# Patient Record
Sex: FEMALE | Race: OTHER | NOT HISPANIC OR LATINO | Employment: UNEMPLOYED | URBAN - METROPOLITAN AREA
[De-identification: names, ages, dates, MRNs, and addresses within clinical notes are randomized per-mention and may not be internally consistent; named-entity substitution may affect disease eponyms.]

---

## 2023-09-30 ENCOUNTER — HOSPITAL ENCOUNTER (OUTPATIENT)
Dept: RADIOLOGY | Facility: HOSPITAL | Age: 38
Discharge: HOME | End: 2023-09-30
Payer: COMMERCIAL

## 2023-09-30 DIAGNOSIS — R10.2 PELVIC AND PERINEAL PAIN: ICD-10-CM

## 2023-09-30 PROBLEM — Z20.822 PERSON UNDER INVESTIGATION FOR COVID-19: Status: RESOLVED | Noted: 2023-09-30 | Resolved: 2023-09-30

## 2023-09-30 PROBLEM — L85.3 DRY SKIN DERMATITIS: Status: ACTIVE | Noted: 2023-09-30

## 2023-09-30 PROBLEM — G89.18 POST-OP PAIN: Status: ACTIVE | Noted: 2023-09-30

## 2023-09-30 PROBLEM — O16.9 HYPERTENSION IN PREGNANCY, ANTEPARTUM (HHS-HCC): Status: ACTIVE | Noted: 2023-09-30

## 2023-09-30 PROBLEM — E22.1 HYPERPROLACTINEMIA (MULTI): Status: ACTIVE | Noted: 2023-09-30

## 2023-09-30 PROBLEM — R51.9 HEADACHE: Status: ACTIVE | Noted: 2023-09-30

## 2023-09-30 PROBLEM — R35.0 URINARY FREQUENCY: Status: ACTIVE | Noted: 2023-09-30

## 2023-09-30 PROBLEM — R87.810 CERVICAL HIGH RISK HPV (HUMAN PAPILLOMAVIRUS) TEST POSITIVE: Status: ACTIVE | Noted: 2023-09-30

## 2023-09-30 PROBLEM — Z20.822 EXPOSURE TO COVID-19 VIRUS: Status: RESOLVED | Noted: 2023-09-30 | Resolved: 2023-09-30

## 2023-09-30 PROBLEM — R31.9 HEMATURIA: Status: ACTIVE | Noted: 2023-09-30

## 2023-09-30 PROBLEM — Z86.59 HISTORY OF CLAUSTROPHOBIA: Status: ACTIVE | Noted: 2023-09-30

## 2023-09-30 PROBLEM — I10 HTN (HYPERTENSION): Status: ACTIVE | Noted: 2023-09-30

## 2023-09-30 PROBLEM — A74.9 CHLAMYDIA INFECTION: Status: ACTIVE | Noted: 2023-09-30

## 2023-09-30 PROBLEM — R05.9 COUGH IN ADULT: Status: ACTIVE | Noted: 2023-09-30

## 2023-09-30 PROBLEM — F98.8 ADD (ATTENTION DEFICIT DISORDER): Status: ACTIVE | Noted: 2023-09-30

## 2023-09-30 PROBLEM — M72.2 PLANTAR FASCIITIS, LEFT: Status: ACTIVE | Noted: 2023-09-30

## 2023-09-30 PROBLEM — Z86.79 HISTORY OF HYPERTENSION: Status: ACTIVE | Noted: 2023-09-30

## 2023-09-30 PROBLEM — O20.0 THREATENED SPONTANEOUS ABORTION (HHS-HCC): Status: ACTIVE | Noted: 2023-09-30

## 2023-09-30 PROBLEM — G44.201 ACUTE INTRACTABLE TENSION-TYPE HEADACHE: Status: RESOLVED | Noted: 2023-09-30 | Resolved: 2023-09-30

## 2023-09-30 PROBLEM — R10.12 ABDOMINAL PAIN, LEFT UPPER QUADRANT: Status: ACTIVE | Noted: 2023-09-30

## 2023-09-30 PROBLEM — N92.6 IRREGULAR MENSTRUATION: Status: ACTIVE | Noted: 2023-09-30

## 2023-09-30 PROBLEM — N39.0 URINARY TRACT INFECTION: Status: ACTIVE | Noted: 2023-09-30

## 2023-09-30 PROBLEM — M22.40 CHONDROMALACIA, PATELLA: Status: ACTIVE | Noted: 2023-09-30

## 2023-09-30 PROBLEM — R10.9 ABDOMINAL PAIN: Status: ACTIVE | Noted: 2023-09-30
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
## 2023-09-30 PROBLEM — H00.015 HORDEOLUM EXTERNUM OF LEFT LOWER EYELID: Status: ACTIVE | Noted: 2023-09-30

## 2023-09-30 PROBLEM — J01.90 ACUTE NON-RECURRENT SINUSITIS: Status: RESOLVED | Noted: 2023-09-30 | Resolved: 2023-09-30

## 2023-09-30 PROBLEM — N92.6 MISSED MENSES: Status: ACTIVE | Noted: 2023-09-30

## 2023-09-30 PROBLEM — H00.014 HORDEOLUM EXTERNUM OF LEFT UPPER EYELID: Status: ACTIVE | Noted: 2023-09-30

## 2023-09-30 PROBLEM — Q78.9: Status: ACTIVE | Noted: 2023-09-30

## 2023-09-30 PROBLEM — J01.00 ACUTE MAXILLARY SINUSITIS: Status: RESOLVED | Noted: 2023-09-30 | Resolved: 2023-09-30
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
## 2023-09-30 PROBLEM — S06.0X0A CONCUSSION WITH NO LOSS OF CONSCIOUSNESS: Status: ACTIVE | Noted: 2023-09-30

## 2023-09-30 PROBLEM — N92.6 ABNORMAL MENSES: Status: ACTIVE | Noted: 2023-09-30

## 2023-09-30 PROBLEM — F41.9 ANXIETY: Status: ACTIVE | Noted: 2023-09-30

## 2023-09-30 PROBLEM — R63.4 WEIGHT LOSS: Status: ACTIVE | Noted: 2023-09-30

## 2023-09-30 PROBLEM — E55.9 VITAMIN D DEFICIENCY: Status: ACTIVE | Noted: 2023-09-30

## 2023-09-30 PROBLEM — D49.7 PITUITARY TUMOR: Status: ACTIVE | Noted: 2023-09-30

## 2023-09-30 PROBLEM — G43.009 MIGRAINE WITHOUT AURA AND WITHOUT STATUS MIGRAINOSUS, NOT INTRACTABLE: Status: ACTIVE | Noted: 2023-09-30

## 2023-09-30 PROBLEM — N91.2 AMENORRHEA: Status: ACTIVE | Noted: 2023-09-30

## 2023-09-30 PROBLEM — J45.20 MILD INTERMITTENT ASTHMA WITHOUT COMPLICATION (HHS-HCC): Status: ACTIVE | Noted: 2023-09-30

## 2023-09-30 PROBLEM — U07.1 LAB TEST POSITIVE FOR DETECTION OF COVID-19 VIRUS: Status: RESOLVED | Noted: 2023-09-30 | Resolved: 2023-09-30

## 2023-09-30 PROCEDURE — 74176 CT ABD & PELVIS W/O CONTRAST: CPT

## 2023-09-30 RX ORDER — ACETAMINOPHEN 325 MG/1
650 TABLET ORAL EVERY 4 HOURS PRN
COMMUNITY
Start: 2022-11-28

## 2023-09-30 RX ORDER — LIDOCAINE 50 MG/G
1 PATCH TOPICAL EVERY 24 HOURS
COMMUNITY
Start: 2022-11-15 | End: 2023-10-03

## 2023-09-30 RX ORDER — DICYCLOMINE HYDROCHLORIDE 20 MG/1
20 TABLET ORAL 4 TIMES DAILY
COMMUNITY
Start: 2023-04-28 | End: 2023-10-03

## 2023-09-30 RX ORDER — OXYCODONE HYDROCHLORIDE 5 MG/1
1 TABLET ORAL EVERY 6 HOURS
COMMUNITY
Start: 2021-04-13 | End: 2023-10-03

## 2023-09-30 RX ORDER — ONDANSETRON 4 MG/1
1 TABLET, ORALLY DISINTEGRATING ORAL EVERY 8 HOURS PRN
COMMUNITY
Start: 2023-04-28 | End: 2023-10-03

## 2023-09-30 RX ORDER — ONDANSETRON 4 MG/1
4 TABLET, FILM COATED ORAL EVERY 6 HOURS PRN
COMMUNITY
End: 2023-10-03

## 2023-09-30 RX ORDER — DOCUSATE SODIUM 100 MG/1
1 CAPSULE, LIQUID FILLED ORAL 2 TIMES DAILY
COMMUNITY
Start: 2021-04-13 | End: 2023-10-03

## 2023-09-30 RX ORDER — LOPERAMIDE HYDROCHLORIDE 2 MG/1
2 CAPSULE ORAL 4 TIMES DAILY PRN
COMMUNITY
Start: 2023-04-28 | End: 2023-10-03

## 2023-09-30 RX ORDER — ETONOGESTREL AND ETHINYL ESTRADIOL VAGINAL RING .015; .12 MG/D; MG/D
1 RING VAGINAL
COMMUNITY
Start: 2022-06-28 | End: 2023-10-03

## 2023-09-30 RX ORDER — ALPRAZOLAM 0.25 MG/1
1 TABLET ORAL SEE ADMIN INSTRUCTIONS
COMMUNITY
Start: 2022-05-02 | End: 2023-10-03

## 2023-09-30 RX ORDER — CYCLOBENZAPRINE HCL 10 MG
1 TABLET ORAL 3 TIMES DAILY PRN
COMMUNITY
Start: 2022-11-15 | End: 2023-10-03

## 2023-09-30 RX ORDER — METHOCARBAMOL 500 MG/1
1 TABLET, FILM COATED ORAL EVERY 6 HOURS PRN
COMMUNITY
Start: 2022-11-28 | End: 2023-10-03

## 2023-09-30 RX ORDER — IBUPROFEN 600 MG/1
1 TABLET ORAL EVERY 6 HOURS PRN
COMMUNITY
Start: 2021-04-13 | End: 2023-10-03

## 2023-09-30 RX ORDER — TRAMADOL HYDROCHLORIDE 50 MG/1
1 TABLET ORAL 4 TIMES DAILY PRN
COMMUNITY
End: 2023-10-03

## 2023-09-30 RX ORDER — ALBUTEROL SULFATE 90 UG/1
2 AEROSOL, METERED RESPIRATORY (INHALATION) EVERY 4 HOURS PRN
COMMUNITY
Start: 2021-02-22

## 2023-10-03 ENCOUNTER — CONSULT (OUTPATIENT)
Dept: OBSTETRICS AND GYNECOLOGY | Facility: CLINIC | Age: 38
End: 2023-10-03
Payer: COMMERCIAL

## 2023-10-03 VITALS
DIASTOLIC BLOOD PRESSURE: 72 MMHG | WEIGHT: 142 LBS | BODY MASS INDEX: 23.66 KG/M2 | SYSTOLIC BLOOD PRESSURE: 116 MMHG | HEIGHT: 65 IN

## 2023-10-03 DIAGNOSIS — R10.2 PAIN IN FEMALE PELVIS: Primary | ICD-10-CM

## 2023-10-03 PROCEDURE — 99213 OFFICE O/P EST LOW 20 MIN: CPT | Performed by: OBSTETRICS & GYNECOLOGY

## 2023-10-03 ASSESSMENT — ENCOUNTER SYMPTOMS: BACK PAIN: 1

## 2023-10-03 NOTE — PROGRESS NOTES
Subjective   Patient ID: Jayda Gonzales is a 38 y.o. female who presents for Pelvic Pain and Back Pain (Follow up [facility] ED ultrasound and CT scan 09/21/2023.).  Pelvic Pain  The patient's primary symptoms include pelvic pain. Associated symptoms include back pain.   Back Pain  Associated symptoms include pelvic pain.    2 prior C-sections.  Started having pelvic pain mid-September.  Eventually radiating to her left flank and into the back.  Now also up in the ribs.    Issues she had some nausea but it is resolved.  No GI or  symptoms.  First day of her last menses was around September 10.  States there is no way she could be pregnant.  Not using contraception.    The pain is much less severe.  I reviewed results from her emergency room visit including blood work as well as pelvic ultrasound.  The ultrasound showed bilateral ovarian follicles    She states she got a voicemail from her primary care physician that recent CT was normal.    We talked about possibilities but her pain radiates from the pelvis up to her ribs.  It is less severe than it was.  Imaging has been normal.  At this time we agreed not to proceed with any further investigations.  If her pain increases or severely affects her quality of life we can review options.  Otherwise follow-up for annual exam at which time we can do a complete abdominal and pelvic exam.    Review of Systems   Genitourinary:  Positive for pelvic pain.   Musculoskeletal:  Positive for back pain.   All other systems reviewed and are negative.      Objective   Physical Exam    Assessment/Plan   38-year-old.  2 prior C-sections.  See HPI for pelvic pain.  Follow-up for annual exam.  Results reviewed include ultrasound blood work and she had a verbal from her primary care physician that her CT scan abdomen pelvis was normal

## 2024-01-11 ENCOUNTER — TELEMEDICINE (OUTPATIENT)
Dept: PRIMARY CARE | Facility: CLINIC | Age: 39
End: 2024-01-11
Payer: COMMERCIAL

## 2024-01-11 DIAGNOSIS — Z86.79 HISTORY OF HYPERTENSION: ICD-10-CM

## 2024-01-11 DIAGNOSIS — J01.00 ACUTE NON-RECURRENT MAXILLARY SINUSITIS: Primary | ICD-10-CM

## 2024-01-11 PROCEDURE — 99212 OFFICE O/P EST SF 10 MIN: CPT | Performed by: INTERNAL MEDICINE

## 2024-01-11 RX ORDER — AZITHROMYCIN 250 MG/1
TABLET, FILM COATED ORAL
Qty: 6 TABLET | Refills: 0 | Status: SHIPPED | OUTPATIENT
Start: 2024-01-11 | End: 2024-01-16

## 2024-01-11 NOTE — PROGRESS NOTES
Subjective   Patient ID: Jayda Gonzales is a 38 y.o. female who presents for No chief complaint on file..    HPI patient is being seen via virtual visit secondary to upper respiratory infection.  Basically her symptoms started around Martina day gradually improved and now back with sinus pressure body aches chills nausea no cough drainage down the back of her throat brown in color.  She did a COVID test yesterday that was negative her children are sick    Review of Systems  Skin no rash  Cardiovascular no chest pain  GI nausea no vomiting or diarrhea    Objective     There were no vitals taken for this visit.    Physical Exam  No acute distress    Assessment/Plan   Diagnoses and all orders for this visit:  Acute non-recurrent maxillary sinusitis  -     azithromycin (Zithromax) 250 mg tablet; Take 2 tablets (500 mg) by mouth once daily for 1 day, THEN 1 tablet (250 mg) once daily for 4 days. Take 2 tabs (500 mg) by mouth today, than 1 daily for 4 days..

## 2024-02-20 ENCOUNTER — TELEMEDICINE (OUTPATIENT)
Dept: PRIMARY CARE | Facility: CLINIC | Age: 39
End: 2024-02-20
Payer: COMMERCIAL

## 2024-02-20 DIAGNOSIS — F41.9 ANXIETY: Primary | ICD-10-CM

## 2024-02-20 PROCEDURE — 1036F TOBACCO NON-USER: CPT | Performed by: INTERNAL MEDICINE

## 2024-02-20 PROCEDURE — 99214 OFFICE O/P EST MOD 30 MIN: CPT | Performed by: INTERNAL MEDICINE

## 2024-02-20 RX ORDER — ALPRAZOLAM 0.25 MG/1
0.25 TABLET ORAL DAILY PRN
Qty: 7 TABLET | Refills: 0 | Status: SHIPPED | OUTPATIENT
Start: 2024-02-20 | End: 2024-02-27

## 2024-02-20 NOTE — PROGRESS NOTES
Subjective   Patient ID: Jayda Gonzales is a 38 y.o. female who presents for No chief complaint on file..    HPI 38-year-old female well-known to me being seen via virtual visit who is currently having a lot of anxiety regarding dealing with her children and their father.  Her 4-year-old son has autism and has had 2 visits recently unsupervised with the father and he is come back acting differently she is very concerned.  She will be getting her  involved and also in the Department of family and children services as well as the  are involved.  She is calling me she has a lot of stress coming up in the next couple of days and would like something to help with her anxiety.  She is taking Xanax prior to having MRI in the past she does not want to be on anything long-term such as an antidepressant.    Review of Systems    Objective   There were no vitals taken for this visit.    Physical Exam  Neurological:      Mental Status: She is alert.   Psychiatric:         Mood and Affect: Mood normal.         Behavior: Behavior normal.         Thought Content: Thought content normal.         Judgment: Judgment normal.         Assessment/Plan   Diagnoses and all orders for this visit:  Anxiety  This will be a one-time short-term prescription she has never had any history of drug abuse in the past and she is going through an extremely stressful time.  If she needs something long-term we may consider Celexa.  She will follow-up with me this Thursday  -     ALPRAZolam (Xanax) 0.25 mg tablet; Take 1 tablet (0.25 mg) by mouth once daily as needed for anxiety for up to 7 days.

## 2024-10-30 ENCOUNTER — OFFICE VISIT (OUTPATIENT)
Dept: URGENT CARE | Age: 39
End: 2024-10-30
Payer: COMMERCIAL

## 2024-10-30 VITALS
SYSTOLIC BLOOD PRESSURE: 168 MMHG | OXYGEN SATURATION: 99 % | HEART RATE: 56 BPM | DIASTOLIC BLOOD PRESSURE: 76 MMHG | WEIGHT: 150 LBS | BODY MASS INDEX: 24.96 KG/M2 | RESPIRATION RATE: 15 BRPM | TEMPERATURE: 98.4 F

## 2024-10-30 DIAGNOSIS — J03.90 TONSILLITIS: Primary | ICD-10-CM

## 2024-10-30 DIAGNOSIS — J02.9 SORE THROAT: ICD-10-CM

## 2024-10-30 LAB
POC BINAX EXPIRATION: NORMAL
POC BINAX NOW COVID SERIAL NUMBER: NORMAL
POC RAPID STREP: NEGATIVE
POC SARS-COV-2 AG BINAX: NORMAL

## 2024-10-30 PROCEDURE — 3077F SYST BP >= 140 MM HG: CPT

## 2024-10-30 PROCEDURE — 87081 CULTURE SCREEN ONLY: CPT

## 2024-10-30 PROCEDURE — 87880 STREP A ASSAY W/OPTIC: CPT

## 2024-10-30 PROCEDURE — 1036F TOBACCO NON-USER: CPT

## 2024-10-30 PROCEDURE — 3078F DIAST BP <80 MM HG: CPT

## 2024-10-30 PROCEDURE — 99213 OFFICE O/P EST LOW 20 MIN: CPT

## 2024-10-30 PROCEDURE — 87811 SARS-COV-2 COVID19 W/OPTIC: CPT

## 2024-10-30 RX ORDER — AMOXICILLIN 500 MG/1
500 CAPSULE ORAL 2 TIMES DAILY
Qty: 20 CAPSULE | Refills: 0 | Status: SHIPPED | OUTPATIENT
Start: 2024-10-30 | End: 2024-11-09

## 2024-10-30 RX ORDER — AZITHROMYCIN 500 MG/1
500 TABLET, FILM COATED ORAL DAILY
Qty: 5 TABLET | Refills: 0 | Status: SHIPPED | OUTPATIENT
Start: 2024-10-30 | End: 2024-11-04

## 2024-10-30 ASSESSMENT — ENCOUNTER SYMPTOMS
FATIGUE: 1
DIARRHEA: 1
SORE THROAT: 1

## 2024-11-01 LAB — S PYO THROAT QL CULT: NORMAL

## 2024-11-04 ENCOUNTER — LAB (OUTPATIENT)
Dept: LAB | Facility: LAB | Age: 39
End: 2024-11-04
Payer: COMMERCIAL

## 2024-11-04 ENCOUNTER — TELEPHONE (OUTPATIENT)
Dept: PRIMARY CARE | Facility: CLINIC | Age: 39
End: 2024-11-04

## 2024-11-04 ENCOUNTER — APPOINTMENT (OUTPATIENT)
Dept: PRIMARY CARE | Facility: CLINIC | Age: 39
End: 2024-11-04
Payer: COMMERCIAL

## 2024-11-04 VITALS
BODY MASS INDEX: 24.79 KG/M2 | OXYGEN SATURATION: 98 % | SYSTOLIC BLOOD PRESSURE: 140 MMHG | HEART RATE: 97 BPM | RESPIRATION RATE: 18 BRPM | WEIGHT: 149 LBS | DIASTOLIC BLOOD PRESSURE: 92 MMHG

## 2024-11-04 DIAGNOSIS — Z00.00 HEALTH CARE MAINTENANCE: ICD-10-CM

## 2024-11-04 DIAGNOSIS — I10 PRIMARY HYPERTENSION: ICD-10-CM

## 2024-11-04 DIAGNOSIS — Z00.00 HEALTH MAINTENANCE EXAMINATION: Primary | ICD-10-CM

## 2024-11-04 DIAGNOSIS — Z00.00 HEALTH MAINTENANCE EXAMINATION: ICD-10-CM

## 2024-11-04 LAB
ALBUMIN SERPL BCP-MCNC: 4.2 G/DL (ref 3.4–5)
ALP SERPL-CCNC: 52 U/L (ref 33–110)
ALT SERPL W P-5'-P-CCNC: 17 U/L (ref 7–45)
ANION GAP SERPL CALC-SCNC: 11 MMOL/L (ref 10–20)
AST SERPL W P-5'-P-CCNC: 17 U/L (ref 9–39)
BASOPHILS # BLD AUTO: 0.04 X10*3/UL (ref 0–0.1)
BASOPHILS NFR BLD AUTO: 0.4 %
BILIRUB SERPL-MCNC: 0.5 MG/DL (ref 0–1.2)
BUN SERPL-MCNC: 15 MG/DL (ref 6–23)
CALCIUM SERPL-MCNC: 9.2 MG/DL (ref 8.6–10.6)
CHLORIDE SERPL-SCNC: 102 MMOL/L (ref 98–107)
CHOLEST SERPL-MCNC: 170 MG/DL (ref 0–199)
CHOLESTEROL/HDL RATIO: 2.2
CO2 SERPL-SCNC: 27 MMOL/L (ref 21–32)
CREAT SERPL-MCNC: 0.65 MG/DL (ref 0.5–1.05)
EGFRCR SERPLBLD CKD-EPI 2021: >90 ML/MIN/1.73M*2
EOSINOPHIL # BLD AUTO: 0.16 X10*3/UL (ref 0–0.7)
EOSINOPHIL NFR BLD AUTO: 1.6 %
ERYTHROCYTE [DISTWIDTH] IN BLOOD BY AUTOMATED COUNT: 13 % (ref 11.5–14.5)
GLUCOSE SERPL-MCNC: 79 MG/DL (ref 74–99)
HCT VFR BLD AUTO: 41.9 % (ref 36–46)
HDLC SERPL-MCNC: 77.4 MG/DL
HGB BLD-MCNC: 13.5 G/DL (ref 12–16)
IMM GRANULOCYTES # BLD AUTO: 0.04 X10*3/UL (ref 0–0.7)
IMM GRANULOCYTES NFR BLD AUTO: 0.4 % (ref 0–0.9)
LDLC SERPL CALC-MCNC: 81 MG/DL
LYMPHOCYTES # BLD AUTO: 1.94 X10*3/UL (ref 1.2–4.8)
LYMPHOCYTES NFR BLD AUTO: 19.7 %
MCH RBC QN AUTO: 28.1 PG (ref 26–34)
MCHC RBC AUTO-ENTMCNC: 32.2 G/DL (ref 32–36)
MCV RBC AUTO: 87 FL (ref 80–100)
MONOCYTES # BLD AUTO: 0.82 X10*3/UL (ref 0.1–1)
MONOCYTES NFR BLD AUTO: 8.3 %
NEUTROPHILS # BLD AUTO: 6.85 X10*3/UL (ref 1.2–7.7)
NEUTROPHILS NFR BLD AUTO: 69.6 %
NON HDL CHOLESTEROL: 93 MG/DL (ref 0–149)
NRBC BLD-RTO: 0 /100 WBCS (ref 0–0)
PLATELET # BLD AUTO: 291 X10*3/UL (ref 150–450)
POTASSIUM SERPL-SCNC: 4.1 MMOL/L (ref 3.5–5.3)
PROT SERPL-MCNC: 7 G/DL (ref 6.4–8.2)
RBC # BLD AUTO: 4.8 X10*6/UL (ref 4–5.2)
SODIUM SERPL-SCNC: 136 MMOL/L (ref 136–145)
TRIGL SERPL-MCNC: 59 MG/DL (ref 0–149)
TSH SERPL-ACNC: 1.02 MIU/L (ref 0.44–3.98)
VLDL: 12 MG/DL (ref 0–40)
WBC # BLD AUTO: 9.9 X10*3/UL (ref 4.4–11.3)

## 2024-11-04 PROCEDURE — 80061 LIPID PANEL: CPT

## 2024-11-04 PROCEDURE — 80053 COMPREHEN METABOLIC PANEL: CPT

## 2024-11-04 PROCEDURE — 36415 COLL VENOUS BLD VENIPUNCTURE: CPT

## 2024-11-04 PROCEDURE — 85025 COMPLETE CBC W/AUTO DIFF WBC: CPT

## 2024-11-04 PROCEDURE — 84443 ASSAY THYROID STIM HORMONE: CPT

## 2024-11-04 PROCEDURE — 3077F SYST BP >= 140 MM HG: CPT | Performed by: INTERNAL MEDICINE

## 2024-11-04 PROCEDURE — 99214 OFFICE O/P EST MOD 30 MIN: CPT | Performed by: INTERNAL MEDICINE

## 2024-11-04 PROCEDURE — 3080F DIAST BP >= 90 MM HG: CPT | Performed by: INTERNAL MEDICINE

## 2024-11-04 PROCEDURE — 1036F TOBACCO NON-USER: CPT | Performed by: INTERNAL MEDICINE

## 2024-11-04 ASSESSMENT — ENCOUNTER SYMPTOMS
SHORTNESS OF BREATH: 0
PALPITATIONS: 0
UNEXPECTED WEIGHT CHANGE: 0
HEADACHES: 1
DEPRESSION: 1
SORE THROAT: 0
LOSS OF SENSATION IN FEET: 0
DYSPHORIC MOOD: 0
ABDOMINAL PAIN: 0
ACTIVITY CHANGE: 0
OCCASIONAL FEELINGS OF UNSTEADINESS: 0

## 2024-11-04 ASSESSMENT — PATIENT HEALTH QUESTIONNAIRE - PHQ9
1. LITTLE INTEREST OR PLEASURE IN DOING THINGS: NOT AT ALL
10. IF YOU CHECKED OFF ANY PROBLEMS, HOW DIFFICULT HAVE THESE PROBLEMS MADE IT FOR YOU TO DO YOUR WORK, TAKE CARE OF THINGS AT HOME, OR GET ALONG WITH OTHER PEOPLE: VERY DIFFICULT
SUM OF ALL RESPONSES TO PHQ9 QUESTIONS 1 AND 2: 1
2. FEELING DOWN, DEPRESSED OR HOPELESS: SEVERAL DAYS

## 2024-11-04 NOTE — PROGRESS NOTES
Subjective   Patient ID: Jayda Gonzaels is a 39 y.o. female who presents for No chief complaint on file..    HPI patient is a 39-year-old female comes for checkup blood pressures been running high when she was in ER in urgent care.    Exercise yes mainly calisthenics some [place] dance class    Diet not salt restricted    Review of Systems   Constitutional:  Negative for activity change and unexpected weight change.   HENT:  Positive for ear pain (Recent upper respiratory infection sore throat treated with azithromycin still has a little bit of fullness in the left ear). Negative for sore throat.    Respiratory:  Negative for shortness of breath.    Cardiovascular:  Negative for chest pain, palpitations and leg swelling.   Gastrointestinal:  Negative for abdominal pain.   Genitourinary:  Negative for menstrual problem (Patient's periods are generally normal).   Neurological:  Positive for headaches (Headache only recently started coinciding with her upper respiratory infection).   Psychiatric/Behavioral:  Negative for dysphoric mood.        Objective   BP (!) 140/92 (BP Location: Left arm, Patient Position: Sitting)   Pulse 97   Resp 18   Wt 67.6 kg (149 lb)   SpO2 98%   BMI 24.79 kg/m²     Physical Exam  Blood pressure 110/84 on the left arm  Ears left ear air-fluid level mild erythema  Lungs clear to auscultation  Heart regular rate and rhythm without gallop rub or murmur  Abdomen negative  Extremities no edema  Assessment/Plan   Diagnoses and all orders for this visit:  Health maintenance examination  -     CBC and Auto Differential; Future  Health care maintenance  -     Comprehensive Metabolic Panel; Future  Primary hypertension  Your blood pressure really looks a lot better today.  Try cutting back on salt and increasing your exercise.  I will check some routine blood work to make sure you are kidney function and thyroid blood count are okay.  He would like to see a nutritionist will be helpful  -      Lipid Panel; Future  -     TSH; Future  -     Referral to Nutrition Services; Future  Left ear clogged you have residual infection finish your antibiotics they will stay in your system for a few more days if you are still having problems let me know  Headache I think this is more related to your recent infection.  You previously had a pituitary tumor that was associated with abnormal menstruation.  Your menstruation is now normal

## 2024-11-05 ENCOUNTER — APPOINTMENT (OUTPATIENT)
Dept: PRIMARY CARE | Facility: CLINIC | Age: 39
End: 2024-11-05
Payer: COMMERCIAL

## 2024-11-05 ENCOUNTER — NUTRITION (OUTPATIENT)
Dept: PRIMARY CARE | Facility: CLINIC | Age: 39
End: 2024-11-05
Payer: COMMERCIAL

## 2024-11-05 VITALS — BODY MASS INDEX: 24.79 KG/M2 | HEIGHT: 65 IN

## 2024-11-05 DIAGNOSIS — I10 PRIMARY HYPERTENSION: ICD-10-CM

## 2024-11-05 DIAGNOSIS — Z71.3 DIETARY COUNSELING AND SURVEILLANCE: Primary | ICD-10-CM

## 2024-11-05 NOTE — PROGRESS NOTES
Nutrition: Initial Assessment    Jayda Goznales is a 39 y.o. female being seen in office who was referred by Dr. Naif Raines on 24 for HTN.       Nutrition Assessment    Food & Nutrition Related History:     Dietary Considerations: {dietaryconsiderations (Optional):33549}  {Allergies:24078}  {Intolerance:48316}  {Appetite:15648}  {Intake:93703}  {GI Symptoms (Optional):39164}  {Swallowing Difficulty:83657}  {Dentition (Optional):45989}  {Food Preparation:47566}  {Cooking Skills/Barriers:85555}  {Grocery Shoppin}  {Supplements:62497} {FREQUENCY:18396}  {Sleep duration/quality (Optional):74759}  {Sleep disorders:75230}  Food Insecurity:     Dietary Recall:   Meal 1:   Meal 2:   Meal 3:     Snacks:  Beverages:  Eating out:   Alcohol Intake:  Self-Identified Challenges:    Physical Activity    Labs:  CMP trend:    Recent Labs     24  1317 23  2357 22  0948 19  2330 19  2217   GLUCOSE 79 97 89   < > 72    138 135*   < > 135   K 4.1 3.5 3.6   < > 3.9    103 101   < > 102   CO2 27 26 26   < > 19*   ANIONGAP 11 13 12   < > 14   BUN 15 10 9   < > 7*   CREATININE 0.65 0.73 0.58   < > 0.5   EGFR >90  --   --   --  151   CALCIUM 9.2 9.1 8.9   < > 9.0   ALBUMIN 4.2 4.4 4.1   < > 3.9   ALKPHOS 52 38 38   < > 45   PROT 7.0 7.1 6.7   < > 6.8   AST 17 20 14   < > 20   BILITOT 0.5 0.7 0.4   < > 0.3   ALT 17 14 11   < > 14    < > = values in this interval not displayed.     Lipid Panel trend:    Recent Labs     24  1317   CHOL 170   HDL 77.4   LDLCALC 81   VLDL 12   TRIG 59     Vit D:   Lab Results   Component Value Date    VITD2 (A) 2018       Nutrition Focused Physical Exam:    {Performed/Deferred:50246}    Muscle Wasting:                   Loss of Subcutaneous Fat:                Other Physical Findings:                   Past Medical History:  Patient Active Problem List   Diagnosis    Abdominal pain    Abnormal menses    Accidental fall    ADD  "(attention deficit disorder)    Anxiety    Cervical high risk HPV (human papillomavirus) test positive    Chlamydia infection    Chondrodystrophy malacia (HHS-HCC)    Chondromalacia, patella    Concussion with no loss of consciousness    Cough in adult    Dry skin dermatitis    Headache    Hematuria    History of claustrophobia    History of hypertension    Hordeolum externum of left lower eyelid    Hordeolum externum of left upper eyelid    HTN (hypertension)    Hypertension in pregnancy, antepartum (HHS-HCC)    Hyperprolactinemia (Multi)    Migraine without aura and without status migrainosus, not intractable    Mild intermittent asthma without complication (HHS-HCC)    Missed menses    Irregular menstruation    Pituitary tumor    Plantar fasciitis, left    Post-op pain    Threatened spontaneous  (HHS-HCC)    Urinary frequency    Urinary tract infection    Vitamin D deficiency    Weight loss    Amenorrhea    Abdominal pain, left upper quadrant        Anthropometrics:  Ht Readings from Last 1 Encounters:   24 1.651 m (5' 5\")     BMI Readings from Last 1 Encounters:   24 24.79 kg/m²     Wt Readings from Last 10 Encounters:   24 67.6 kg (149 lb)   10/30/24 68 kg (150 lb)   24 66 kg (145 lb 8.1 oz)   24 67.6 kg (149 lb 0.5 oz)   10/03/23 64.4 kg (142 lb)   22 77.6 kg (171 lb)   22 77.6 kg (171 lb)   22 76.2 kg (168 lb)   22 52.6 kg (116 lb)   22 75.3 kg (166 lb)       Weight change:   Significant weight change: {YES/NA/NO:97639}    Estimated Nutrition Needs:                     Nutrition Diagnosis                                                             Nutrition Interventions/Recommendations   FOOD & NUTRIENT DELIVERY: {NUTRIENTDELIVERY:94156}    NUTRITION COUNSELING: {nutrition counselin}    COORDINATION OF CARE: {coordination of care:02778}    NUTRITION EDUCATION:     {Educational Handouts:09516:x::1}    *Patient expressed understanding " of the education provided and denied any additional questions/concerns.       Nutrition Monitoring and Evaluation   {Nutrition Goals (Optional):62597}    {Readiness to Change (Optional):98928}  {Level of Understanding (Optional):64812}  {Anticipated Compliant (Optional):74431}     Follow-up: {RTC:28525}

## 2024-11-05 NOTE — PROGRESS NOTES
Nutrition: Initial Assessment    Jayda Gonzales is a 39 y.o. female being seen in office who was referred by Dr. Naif Raines on 11/4/24 for HTN.       Nutrition Assessment    Food & Nutrition Related History: Pt presents for nutrition counseling. She is concerned about her blood pressure readings and prefers to manage her HTN without medications, if possible. In the past, she followed a more whole food, plant-based diet. However, since moving back to [place], she has increased eating out. The last couple months she has tried to reduce fast food and greasy foods. Does report using salt when cooking. She also notes family stressors that may be influencing her blood pressure. She has 2 children (ages 3 and 5).     Dietary Considerations: does not drink cow's milk   Allergies: lobster, shrimp  Intolerance: None  Appetite: Normal  Intake: >75%  GI Symptoms : None  Swallowing Difficulty: No problems with swallowing  Dentition : own  Food Preparation: Patient  Cooking Skills/Barriers: None reported  Grocery Shopping: Patient  Supplements: Multivitamin and Vitamin C irregularly  Food Insecurity: Denies     Dietary Recall:   - handful of grapes, blueberry bagel with 1/2 tbsp olive oil butter, a couple pieces of cheese, a couple dates, richi shot, handful of mixed nuts, water   - Lays potato chips (1.5 servings)   - 1.5 cups buttered noodles     Snacks: potato chips   Beverages: water   Eating out: 2x per week takeout, 1x dining out   Alcohol Intake: none (gave up etoh 2 yrs ago)     Physical Activity:  2-3x per week, dance and belkis     Labs:  CMP trend:    Recent Labs     11/04/24  1317 09/21/23  2357 06/14/22  0948 05/13/19  2330 03/06/19  2217   GLUCOSE 79 97 89   < > 72    138 135*   < > 135   K 4.1 3.5 3.6   < > 3.9    103 101   < > 102   CO2 27 26 26   < > 19*   ANIONGAP 11 13 12   < > 14   BUN 15 10 9   < > 7*   CREATININE 0.65 0.73 0.58   < > 0.5   EGFR >90  --   --   --  151   CALCIUM 9.2 9.1  8.9   < > 9.0   ALBUMIN 4.2 4.4 4.1   < > 3.9   ALKPHOS 52 38 38   < > 45   PROT 7.0 7.1 6.7   < > 6.8   AST 17 20 14   < > 20   BILITOT 0.5 0.7 0.4   < > 0.3   ALT 17 14 11   < > 14    < > = values in this interval not displayed.     Lipid Panel trend:    Recent Labs     24  1317   CHOL 170   HDL 77.4   LDLCALC 81   VLDL 12   TRIG 59     Vit D:   Lab Results   Component Value Date     (A) 2018     Blood Pressure:   BP Readings from Last 10 Encounters:   24 (!) 140/92   10/30/24 168/76   24 156/89   24 (!) 158/94   24 (!) 164/117   10/03/23 116/72   22 122/72   22 130/76   22 118/68   22 130/72     Nutrition Focused Physical Exam:  Performed/Deferred: Deferred as pt visually appears well-nourished with no signs of malnutrition      Past Medical History:  Patient Active Problem List   Diagnosis    Abdominal pain    Abnormal menses    Accidental fall    ADD (attention deficit disorder)    Anxiety    Cervical high risk HPV (human papillomavirus) test positive    Chlamydia infection    Chondrodystrophy malacia (HHS-HCC)    Chondromalacia, patella    Concussion with no loss of consciousness    Cough in adult    Dry skin dermatitis    Headache    Hematuria    History of claustrophobia    History of hypertension    Hordeolum externum of left lower eyelid    Hordeolum externum of left upper eyelid    HTN (hypertension)    Hypertension in pregnancy, antepartum (HHS-HCC)    Hyperprolactinemia (Multi)    Migraine without aura and without status migrainosus, not intractable    Mild intermittent asthma without complication (HHS-HCC)    Missed menses    Irregular menstruation    Pituitary tumor    Plantar fasciitis, left    Post-op pain    Threatened spontaneous  (HHS-HCC)    Urinary frequency    Urinary tract infection    Vitamin D deficiency    Weight loss    Amenorrhea    Abdominal pain, left upper quadrant        Anthropometrics:  Ht Readings from  "Last 1 Encounters:   11/05/24 1.651 m (5' 5\")     BMI Readings from Last 1 Encounters:   11/05/24 24.79 kg/m²     Wt Readings from Last 10 Encounters:   11/04/24 67.6 kg (149 lb)   10/30/24 68 kg (150 lb)   05/21/24 66 kg (145 lb 8.1 oz)   04/30/24 67.6 kg (149 lb 0.5 oz)   10/03/23 64.4 kg (142 lb)   06/28/22 77.6 kg (171 lb)   06/21/22 77.6 kg (171 lb)   05/25/22 76.2 kg (168 lb)   05/13/22 52.6 kg (116 lb)   05/05/22 75.3 kg (166 lb)     Significant weight change: No    Estimated Nutrition Needs:    Total Energy Estimated Needs (kCal): 1757.6 kCal   Method for Estimating Needs: MSJ 1352 x 1.3   Total Protein Estimated Needs (g): 68 g   Total Protein Estimated Needs (g/kg): 1 g/kg    Nutrition Diagnosis       Patient has Nutrition Diagnosis: Yes Diagnosis Status (1): New  Nutrition Diagnosis 1: Food and nutrition related knowledge deficit Related to (1): limited prior nutrition edu re: HTN As Evidenced by (1): pt demonstrates incomplete knowledge, pt demonstrates desire to learn       Nutrition Interventions/Recommendations   FOOD & NUTRIENT DELIVERY: DASH Diet, Decreased Sodium Diet, and Increased Fiber Diet    NUTRITION COUNSELING: Motivational Interviewing     COORDINATION OF CARE:  None    NUTRITION EDUCATION:   Provided education on a heart-healthy diet:  Encouraged eating a diet rich in fiber (at least 25 grams per day) and discussed that fiber is found in fruits, vegetables, whole grains, beans, lentils, nuts, seeds.   Discussed limiting sodium to < 9906-3791 mg per day.   Discussed basics of DASH diet.     Educational Handouts: NCM HTN MNT, High Fiber Nutrition Therapy     *Patient expressed understanding of the education provided and denied any additional questions/concerns.       Nutrition Monitoring and Evaluation   Nutrition Goals : Blood pressure control  Consistent meal/snack pattern  Decrease sodium intake  Type of food/meals: Increase fiber to 20-25 g daily     Readiness to Change : Good  Level of " Understanding : Good  Anticipated Compliant : Good     Follow-up: next available - pt would like to discuss meal planning

## 2024-11-12 DIAGNOSIS — H66.90 EAR INFECTION: Primary | ICD-10-CM

## 2024-11-12 RX ORDER — SULFAMETHOXAZOLE AND TRIMETHOPRIM 800; 160 MG/1; MG/1
1 TABLET ORAL 2 TIMES DAILY
Qty: 14 TABLET | Refills: 0 | Status: SHIPPED | OUTPATIENT
Start: 2024-11-12 | End: 2024-11-19

## 2024-11-18 ENCOUNTER — APPOINTMENT (OUTPATIENT)
Dept: NUTRITION | Facility: HOSPITAL | Age: 39
End: 2024-11-18
Payer: COMMERCIAL

## 2024-11-18 DIAGNOSIS — I10 HYPERTENSION, UNSPECIFIED TYPE: ICD-10-CM

## 2024-11-18 DIAGNOSIS — Z71.3 DIETARY COUNSELING AND SURVEILLANCE: Primary | ICD-10-CM

## 2024-11-18 PROCEDURE — 97803 MED NUTRITION INDIV SUBSEQ: CPT | Mod: GT,U1

## 2024-11-18 NOTE — PROGRESS NOTES
Nutrition: Follow-up     Jayda Gonzales is a 39 y.o. female being seen in office who was referred by Dr. Naif Raines on 11/4/24 for HTN.     Visit 1: 11/5/24    Nutrition Assessment    Food & Nutrition Related History: Pt presents for nutrition counseling. She reports she has been more mindful about not salting her food. She has increased her fruit intake.     Last visit: She is concerned about her blood pressure readings and prefers to manage her HTN without medications, if possible. In the past, she followed a more whole food, plant-based diet. However, since moving back to [place], she has increased eating out. The last couple months she has tried to reduce fast food and greasy foods. Does report using salt when cooking. She also notes family stressors that may be influencing her blood pressure. She has 2 children (ages 3 and 5).     Dietary Considerations: does not drink cow's milk   Allergies: lobster, shrimp  Intolerance: None  Appetite: Normal  Intake: >75%  GI Symptoms : None  Swallowing Difficulty: No problems with swallowing  Dentition : own  Food Preparation: Patient  Cooking Skills/Barriers: None reported  Grocery Shopping: Patient  Supplements: Multivitamin and Vitamin C irregularly  Food Insecurity: Denies     Dietary Recall:   - handful of grapes, blueberry bagel with 1/2 tbsp olive oil butter, a couple pieces of cheese, a couple dates, richi shot, handful of mixed nuts, water   - Lays potato chips (1.5 servings)   - 1.5 cups buttered noodles     Snacks: potato chips   Beverages: water   Eating out: 2x per week takeout, 1x dining out   Alcohol Intake: none (gave up etoh 2 yrs ago)     Physical Activity:  2-3x per week, dance and belkis     BP Readings from Last 20 Encounters:   11/04/24 (!) 140/92   10/30/24 168/76   06/03/24 156/89   05/21/24 (!) 158/94   04/30/24 (!) 164/117   10/03/23 116/72   06/28/22 122/72   06/21/22 130/76   05/25/22 118/68   05/05/22 130/72   04/19/22 116/64    10/13/21 130/86   08/10/21 112/78   06/30/21 134/90   03/22/21 130/82   02/22/21 130/76   12/10/20 106/62   09/22/20 126/84   08/04/20 120/78   07/13/20 116/67     Labs:  CMP trend:    Recent Labs     11/04/24  1317 09/21/23  2357 06/14/22  0948 05/13/19  2330 03/06/19  2217   GLUCOSE 79 97 89   < > 72    138 135*   < > 135   K 4.1 3.5 3.6   < > 3.9    103 101   < > 102   CO2 27 26 26   < > 19*   ANIONGAP 11 13 12   < > 14   BUN 15 10 9   < > 7*   CREATININE 0.65 0.73 0.58   < > 0.5   EGFR >90  --   --   --  151   CALCIUM 9.2 9.1 8.9   < > 9.0   ALBUMIN 4.2 4.4 4.1   < > 3.9   ALKPHOS 52 38 38   < > 45   PROT 7.0 7.1 6.7   < > 6.8   AST 17 20 14   < > 20   BILITOT 0.5 0.7 0.4   < > 0.3   ALT 17 14 11   < > 14    < > = values in this interval not displayed.     Lipid Panel trend:    Recent Labs     11/04/24  1317   CHOL 170   HDL 77.4   LDLCALC 81   VLDL 12   TRIG 59     Vit D:   Lab Results   Component Value Date    VITD25 24 (A) 07/24/2018     Blood Pressure:   BP Readings from Last 10 Encounters:   11/04/24 (!) 140/92   10/30/24 168/76   06/03/24 156/89   05/21/24 (!) 158/94   04/30/24 (!) 164/117   10/03/23 116/72   06/28/22 122/72   06/21/22 130/76   05/25/22 118/68   05/05/22 130/72     Nutrition Focused Physical Exam:  Performed/Deferred: Deferred as pt visually appears well-nourished with no signs of malnutrition      Past Medical History:  Patient Active Problem List   Diagnosis    Abdominal pain    Abnormal menses    Accidental fall    ADD (attention deficit disorder)    Anxiety    Cervical high risk HPV (human papillomavirus) test positive    Chlamydia infection    Chondrodystrophy malacia ([facility]HS-HCC)    Chondromalacia, patella    Concussion with no loss of consciousness    Cough in adult    Dry skin dermatitis    Headache    Hematuria    History of claustrophobia    History of hypertension    Hordeolum externum of left lower eyelid    Hordeolum externum of left upper eyelid    HTN  "(hypertension)    Hypertension in pregnancy, antepartum ([facility]HS-HCC)    Hyperprolactinemia (Multi)    Migraine without aura and without status migrainosus, not intractable    Mild intermittent asthma without complication (HHS-HCC)    Missed menses    Irregular menstruation    Pituitary tumor    Plantar fasciitis, left    Post-op pain    Threatened spontaneous  (HHS-HCC)    Urinary frequency    Urinary tract infection    Vitamin D deficiency    Weight loss    Amenorrhea    Abdominal pain, left upper quadrant        Anthropometrics:  Ht Readings from Last 1 Encounters:   24 1.651 m (5' 5\")     BMI Readings from Last 1 Encounters:   24 24.79 kg/m²     Wt Readings from Last 10 Encounters:   24 67.6 kg (149 lb)   10/30/24 68 kg (150 lb)   24 66 kg (145 lb 8.1 oz)   24 67.6 kg (149 lb 0.5 oz)   10/03/23 64.4 kg (142 lb)   22 77.6 kg (171 lb)   22 77.6 kg (171 lb)   22 76.2 kg (168 lb)   22 52.6 kg (116 lb)   22 75.3 kg (166 lb)     Significant weight change: No    Estimated Nutrition Needs:    Total Energy Estimated Needs (kCal): 1757.6 kCal   Method for Estimating Needs: MSJ 1352 x 1.3   Total Protein Estimated Needs (g): 68 g   Total Protein Estimated Needs (g/kg): 1 g/kg    Nutrition Diagnosis       Patient has Nutrition Diagnosis: Yes Diagnosis Status (1): Ongoing  Nutrition Diagnosis 1: Food and nutrition related knowledge deficit Related to (1): limited prior nutrition edu re: HTN As Evidenced by (1): pt demonstrates incomplete knowledge, pt demonstrates desire to learn       Nutrition Interventions/Recommendations   FOOD & NUTRIENT DELIVERY: DASH Diet, Decreased Sodium Diet, and Increased Fiber Diet    NUTRITION COUNSELING: Motivational Interviewing     COORDINATION OF CARE: Consider scheduling with mental health professional     NUTRITION EDUCATION:   Provided education on a heart-healthy diet:  Answered patient's questions about specific low sodium " products.   Spent time discussing low sodium meal ideas.   Discussed whole food, plant-based diet.     Educational Handouts: 1700 Calorie Low Sodium Meal Plan, [employer] Food as [employer] Jumpstart     *Patient expressed understanding of the education provided and denied any additional questions/concerns.       Nutrition Monitoring and Evaluation   Nutrition Goals : Blood pressure control - no update  Consistent meal/snack pattern - not met  Decrease sodium intake - partially met, ongoing   Type of food/meals: Increase fiber to 20-25 g daily - partially met, ongoing     Readiness to Change : Good  Level of Understanding : Good  Anticipated Compliant : Good     Follow-up: 2-3 months

## 2025-04-14 ENCOUNTER — APPOINTMENT (OUTPATIENT)
Dept: PRIMARY CARE | Facility: CLINIC | Age: 40
End: 2025-04-14
Payer: COMMERCIAL

## 2025-04-14 VITALS
OXYGEN SATURATION: 99 % | BODY MASS INDEX: 24.96 KG/M2 | RESPIRATION RATE: 18 BRPM | HEART RATE: 77 BPM | WEIGHT: 150 LBS

## 2025-04-14 DIAGNOSIS — R01.1 HEART MURMUR: ICD-10-CM

## 2025-04-14 DIAGNOSIS — I10 PRIMARY HYPERTENSION: Primary | ICD-10-CM

## 2025-04-14 LAB
POC APPEARANCE, URINE: CLEAR
POC BILIRUBIN, URINE: NEGATIVE
POC BLOOD, URINE: NEGATIVE
POC COLOR, URINE: YELLOW
POC GLUCOSE, URINE: NEGATIVE MG/DL
POC KETONES, URINE: NEGATIVE MG/DL
POC LEUKOCYTES, URINE: NEGATIVE
POC NITRITE,URINE: NEGATIVE
POC PH, URINE: 6.5 PH
POC PROTEIN, URINE: NEGATIVE MG/DL
POC SPECIFIC GRAVITY, URINE: 1.02
POC UROBILINOGEN, URINE: 0.2 EU/DL

## 2025-04-14 PROCEDURE — 99213 OFFICE O/P EST LOW 20 MIN: CPT | Performed by: INTERNAL MEDICINE

## 2025-04-14 PROCEDURE — 99395 PREV VISIT EST AGE 18-39: CPT | Performed by: INTERNAL MEDICINE

## 2025-04-14 PROCEDURE — 81002 URINALYSIS NONAUTO W/O SCOPE: CPT | Performed by: INTERNAL MEDICINE

## 2025-04-14 RX ORDER — AMLODIPINE BESYLATE 2.5 MG/1
2.5 TABLET ORAL DAILY
Qty: 30 TABLET | Refills: 5 | Status: SHIPPED | OUTPATIENT
Start: 2025-04-14 | End: 2025-04-14 | Stop reason: SDUPTHER

## 2025-04-14 RX ORDER — AMLODIPINE BESYLATE 2.5 MG/1
2.5 TABLET ORAL DAILY
Qty: 30 TABLET | Refills: 5 | Status: SHIPPED | OUTPATIENT
Start: 2025-04-14 | End: 2025-10-11

## 2025-04-14 ASSESSMENT — ENCOUNTER SYMPTOMS
GASTROINTESTINAL NEGATIVE: 1
HEADACHES: 0
UNEXPECTED WEIGHT CHANGE: 0
DYSPHORIC MOOD: 0
ARTHRALGIAS: 0
PALPITATIONS: 0

## 2025-04-14 NOTE — PROGRESS NOTES
Subjective   Patient ID: Jayda Gonzales is a 39 y.o. female who presents for Annual Exam.    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   HENT: Negative.     Eyes:  Negative for visual disturbance.   Cardiovascular:  Negative for chest pain, palpitations and leg swelling.   Gastrointestinal: Negative.    Genitourinary: Negative.         Menstruation regular cycle    Musculoskeletal:  Negative for arthralgias.   Neurological:  Negative for headaches.   Psychiatric/Behavioral:  Negative for dysphoric mood.        Objective   There were no vitals taken for this visit.    Physical Exam  Constitutional:       Appearance: Normal appearance.   Neck:      Vascular: Carotid bruit (Bilateral soft bruits left greater than right pulses 1+) present.      Comments: Thyroid normal  Cardiovascular:      Rate and Rhythm: Regular rhythm.      Heart sounds: Murmur (1/6 murmur heard in the right upper left lower sternal border areas) heard.   Pulmonary:      Breath sounds: Normal breath sounds.   Abdominal:      General: Abdomen is flat. Bowel sounds are normal.      Tenderness: There is no abdominal tenderness.   Musculoskeletal:      Cervical back: No rigidity.      Right lower leg: No edema.      Left lower leg: No edema.   Lymphadenopathy:      Cervical: No cervical adenopathy.   Skin:     Findings: No lesion.   Neurological:      Mental Status: She is oriented to person, place, and time. Mental status is at baseline.   Psychiatric:         Mood and Affect: Mood normal.         Assessment/Plan   Diagnoses and all orders for this visit:  Primary hypertension  - Your blood pressure has been elevated off and on today I get 144/94 this is too high it has been higher recently.  Your diet and lifestyle is really good you could cut down on caffeine a little bit I think it is  time to start a blood pressure pill  -     POCT UA (nonautomated) manually resulted  -     amLODIPine (Norvasc) 2.5 mg tablet; Take 1 tablet (2.5 mg) by mouth once daily.  Heart murmur  - You have had this present before it is very mild lets check it out with an echocardiogram also I hear a sound in your carotid arteries which could be coming from the murmur or could indicate blockage.  We definitely want your blood pressure under 130/80  -     Transthoracic Echo (TTE) Complete; Future  -     Vascular US Carotid Artery Duplex Bilateral; Future  Follow-up with one of my colleagues in 2 months for blood pressure check   You have a history of asthma it really is not bothering you you have an inhaler that you not using

## 2025-04-21 ENCOUNTER — APPOINTMENT (OUTPATIENT)
Dept: CARDIOLOGY | Facility: CLINIC | Age: 40
End: 2025-04-21
Payer: COMMERCIAL

## 2025-04-21 ENCOUNTER — APPOINTMENT (OUTPATIENT)
Dept: RADIOLOGY | Facility: CLINIC | Age: 40
End: 2025-04-21
Payer: COMMERCIAL

## 2025-05-12 ENCOUNTER — APPOINTMENT (OUTPATIENT)
Dept: VASCULAR MEDICINE | Facility: CLINIC | Age: 40
End: 2025-05-12
Payer: COMMERCIAL

## 2025-05-12 ENCOUNTER — HOSPITAL ENCOUNTER (OUTPATIENT)
Dept: VASCULAR MEDICINE | Facility: CLINIC | Age: 40
Discharge: HOME | End: 2025-05-12
Payer: COMMERCIAL

## 2025-05-12 ENCOUNTER — HOSPITAL ENCOUNTER (OUTPATIENT)
Dept: CARDIOLOGY | Facility: CLINIC | Age: 40
Discharge: HOME | End: 2025-05-12
Payer: COMMERCIAL

## 2025-05-12 DIAGNOSIS — R09.89 OTHER SPECIFIED SYMPTOMS AND SIGNS INVOLVING THE CIRCULATORY AND RESPIRATORY SYSTEMS: ICD-10-CM

## 2025-05-12 DIAGNOSIS — R01.1 HEART MURMUR: ICD-10-CM

## 2025-05-12 LAB
AORTIC VALVE PEAK VELOCITY: 1.5 M/S
AV PEAK GRADIENT: 9 MMHG
AVA (PEAK VEL): 2.32 CM2
EJECTION FRACTION APICAL 4 CHAMBER: 52.4
EJECTION FRACTION: 58 %
LEFT ATRIUM VOLUME AREA LENGTH INDEX BSA: 33.3 ML/M2
LEFT VENTRICLE INTERNAL DIMENSION DIASTOLE: 4.9 CM (ref 3.5–6)
LEFT VENTRICULAR OUTFLOW TRACT DIAMETER: 1.94 CM
MITRAL VALVE E/A RATIO: 1.28
RIGHT VENTRICLE FREE WALL PEAK S': 13 CM/S
RIGHT VENTRICLE PEAK SYSTOLIC PRESSURE: 23.3 MMHG
TRICUSPID ANNULAR PLANE SYSTOLIC EXCURSION: 2.4 CM

## 2025-05-12 PROCEDURE — 93306 TTE W/DOPPLER COMPLETE: CPT | Performed by: INTERNAL MEDICINE

## 2025-05-12 PROCEDURE — 93306 TTE W/DOPPLER COMPLETE: CPT

## 2025-05-12 PROCEDURE — 93880 EXTRACRANIAL BILAT STUDY: CPT | Performed by: INTERNAL MEDICINE

## 2025-05-12 PROCEDURE — 93880 EXTRACRANIAL BILAT STUDY: CPT

## 2025-06-16 ENCOUNTER — OFFICE VISIT (OUTPATIENT)
Dept: URGENT CARE | Age: 40
End: 2025-06-16
Payer: COMMERCIAL

## 2025-06-16 VITALS
HEART RATE: 64 BPM | SYSTOLIC BLOOD PRESSURE: 155 MMHG | WEIGHT: 140 LBS | TEMPERATURE: 99.5 F | RESPIRATION RATE: 16 BRPM | BODY MASS INDEX: 23.3 KG/M2 | DIASTOLIC BLOOD PRESSURE: 83 MMHG | OXYGEN SATURATION: 99 %

## 2025-06-16 DIAGNOSIS — R05.9 COUGH, UNSPECIFIED TYPE: Primary | ICD-10-CM

## 2025-06-16 DIAGNOSIS — B34.8 RHINOVIRUS: ICD-10-CM

## 2025-06-16 LAB
POC CORONAVIRUS SARS-COV-2 PCR: NEGATIVE
POC HUMAN RHINOVIRUS PCR: POSITIVE
POC INFLUENZA A VIRUS PCR: NEGATIVE
POC INFLUENZA B VIRUS PCR: NEGATIVE
POC RESPIRATORY SYNCYTIAL VIRUS PCR: NEGATIVE

## 2025-06-16 PROCEDURE — 3079F DIAST BP 80-89 MM HG: CPT | Performed by: NURSE PRACTITIONER

## 2025-06-16 PROCEDURE — 3077F SYST BP >= 140 MM HG: CPT | Performed by: NURSE PRACTITIONER

## 2025-06-16 PROCEDURE — 1036F TOBACCO NON-USER: CPT | Performed by: NURSE PRACTITIONER

## 2025-06-16 PROCEDURE — 99213 OFFICE O/P EST LOW 20 MIN: CPT | Performed by: NURSE PRACTITIONER

## 2025-06-16 PROCEDURE — 87631 RESP VIRUS 3-5 TARGETS: CPT | Performed by: NURSE PRACTITIONER

## 2025-06-16 NOTE — PROGRESS NOTES
Subjective   Patient ID: Jayda Gonzales is a 39 y.o. female. They present today with a chief complaint of Cough (Cough, achey, tired x 2 days.  Runny nose 2 days ago).    History of Present Illness  Patient presents with c/o cough, congestion, runny nose, and body aches for approximately 2 days.  States she took Tylenol last night.  Thinks she may have had a fever but did not check it.      Past Medical History  Allergies as of 06/16/2025 - Reviewed 06/16/2025   Allergen Reaction Noted    Latex, natural rubber Hives and Itching 08/30/2008    Levofloxacin Hives, Rash, and Unknown 01/18/2021    Penicillins Unknown 09/30/2023    Shellfish derived Hives 01/18/2021       Prescriptions Prior to Admission[1]     Medical History[2]    Surgical History[3]     reports that she has quit smoking. Her smoking use included cigarettes. She has been exposed to tobacco smoke. She has never used smokeless tobacco. She reports that she does not currently use alcohol. She reports current drug use. Frequency: 2.00 times per week. Drug: Marijuana.    Review of Systems  Review of Systems     See HPI                          Objective    Vitals:    06/16/25 0934 06/16/25 1003   BP: (!) 141/93 155/83   BP Location: Left arm    Patient Position: Sitting    BP Cuff Size: Adult    Pulse: 64    Resp: 16    Temp: 37.5 °C (99.5 °F)    TempSrc: Oral    SpO2: 99%    Weight: 63.5 kg (140 lb)      Patient's last menstrual period was 06/16/2025.    Physical Exam  CONSTITUTIONAL: The general appearance and condition of the patient were examined.  Level of distress, nutrition, external development abnormality, and general behavior were noted.  No abnormal findings.  Vital signs as documented.        ENT: Erythema with pebbling to throat.  No enlarged tonsils or exudate.     CARDIOVASCULAR: The patient's heart examined for regular rate and rhythm and presence of murmurs. Note taken of any tachycardia, bradycardia or any irregular rhythm.  No  abnormal findings.        RESPIRATORY/LUNGS: Chest examined for equal movement, bilaterally.  Lungs examined for equality of breath sounds. Presence or absence of rales noted bilaterally.  Examined for the presence of diffuse or scattered wheezes.  No abnormal findings.      Procedures    Point of Care Test & Imaging Results from this visit  No results found for this visit on 06/16/25.   Imaging  No results found.    Cardiology, Vascular, and Other Imaging  No other imaging results found for the past 2 days      Diagnostic study results (if any) were reviewed by YARELIS Dumont.    Assessment/Plan   Allergies, medications, history, and pertinent labs/EKGs/Imaging reviewed by YARELIS Dumont.     Medical Decision Making  Patient Instructions   Supportive care - encourage clear fluids ( water, Pedialyte, ) , chicken broth/soup and warm fluids can be soothing as well.  Rest, adjust room temperature and humidity.  Use saline spray/drops as needed.  Tylenol or Motrin if needed for fever.   Follow up with PCP if you are not feeling any better.    Repeat /83.      Orders and Diagnoses  Diagnoses and all orders for this visit:  Cough, unspecified type  -     POCT SPOTFIRE R/ST Panel Mini w/COVID ([facility]) manually resulted  Rhinovirus      Medical Admin Record      Patient disposition: Home    Electronically signed by YARELIS Dumont  10:07 AM           [1] (Not in a hospital admission)   [2]   Past Medical History:  Diagnosis Date    Acute maxillary sinusitis 09/30/2023    Acute non-recurrent sinusitis 09/30/2023    Exposure to COVID-19 virus 09/30/2023    Hordeolum externum left lower eyelid 06/30/2021    Hordeolum externum of left lower eyelid    Lab test positive for detection of COVID-19 virus 09/30/2023    Other disorders of pituitary gland 02/21/2020    Pituitary mass    Overweight 01/31/2020    Overweight (BMI 25.0-29.9)    Person under investigation for COVID-19 09/30/2023     Personal history of other (healed) physical injury and trauma 2014    History of sprain    Personal history of other (healed) physical injury and trauma 2014    History of sprain of ankle    Personal history of other benign neoplasm 2018    History of prolactinoma    Personal history of other diseases of the female genital tract 2018    History of amenorrhea    Personal history of other diseases of the female genital tract 2016    History of endometritis    Personal history of other diseases of the respiratory system     History of bronchitis    Personal history of other diseases of the respiratory system 2017    History of influenza    Personal history of other specified conditions 2017    History of fever    Personal history of other specified conditions 2013    History of abdominal pain   [3]   Past Surgical History:  Procedure Laterality Date    OTHER SURGICAL HISTORY  2019    Pituitary surgery    OTHER SURGICAL HISTORY  2020     section    OTHER SURGICAL HISTORY  2020    Dilation and curettage

## 2025-07-14 ENCOUNTER — APPOINTMENT (OUTPATIENT)
Dept: PRIMARY CARE | Facility: CLINIC | Age: 40
End: 2025-07-14
Payer: COMMERCIAL

## 2025-07-14 VITALS
HEIGHT: 65 IN | BODY MASS INDEX: 24.66 KG/M2 | RESPIRATION RATE: 22 BRPM | DIASTOLIC BLOOD PRESSURE: 86 MMHG | HEART RATE: 56 BPM | WEIGHT: 148 LBS | OXYGEN SATURATION: 98 % | SYSTOLIC BLOOD PRESSURE: 133 MMHG

## 2025-07-14 DIAGNOSIS — Z76.89 ESTABLISHING CARE WITH NEW DOCTOR, ENCOUNTER FOR: ICD-10-CM

## 2025-07-14 DIAGNOSIS — Z01.419 WOMEN'S ANNUAL ROUTINE GYNECOLOGICAL EXAMINATION: ICD-10-CM

## 2025-07-14 DIAGNOSIS — I10 PRIMARY HYPERTENSION: ICD-10-CM

## 2025-07-14 DIAGNOSIS — Z11.3 SCREEN FOR STD (SEXUALLY TRANSMITTED DISEASE): ICD-10-CM

## 2025-07-14 DIAGNOSIS — Z00.00 HEALTHCARE MAINTENANCE: ICD-10-CM

## 2025-07-14 DIAGNOSIS — R93.1 ECHOCARDIOGRAM FINDINGS ABNORMAL, WITHOUT DIAGNOSIS: Primary | ICD-10-CM

## 2025-07-14 DIAGNOSIS — F43.10 PTSD (POST-TRAUMATIC STRESS DISORDER): ICD-10-CM

## 2025-07-14 DIAGNOSIS — Z00.00 BLOOD TESTS FOR ROUTINE GENERAL PHYSICAL EXAMINATION: ICD-10-CM

## 2025-07-14 PROCEDURE — 3079F DIAST BP 80-89 MM HG: CPT

## 2025-07-14 PROCEDURE — 3075F SYST BP GE 130 - 139MM HG: CPT

## 2025-07-14 PROCEDURE — 99215 OFFICE O/P EST HI 40 MIN: CPT

## 2025-07-14 PROCEDURE — 1036F TOBACCO NON-USER: CPT

## 2025-07-14 PROCEDURE — 3008F BODY MASS INDEX DOCD: CPT

## 2025-07-14 RX ORDER — ACETAMINOPHEN 500 MG
1 TABLET ORAL DAILY
Qty: 1 KIT | Refills: 0 | Status: SHIPPED | OUTPATIENT
Start: 2025-07-14

## 2025-07-14 ASSESSMENT — ENCOUNTER SYMPTOMS: SHORTNESS OF BREATH: 1

## 2025-07-14 NOTE — PROGRESS NOTES
Subjective   Jayda Gonzales is a 39 y.o. who presents for sexually transmitted infection screening. Sexual history reviewed with the patient. STI exposures include {Diagnoses; std:05559}. Patient reports previous history of the following STIs: {Diagnoses; std:97613}. Current symptoms include {std sx :92984}.    No obstetric history on file.   Social History     Substance and Sexual Activity   Sexual Activity Not on file         Objective   Physical Exam    Assessment/Plan   {Assess/PlanSmartLinks:28935}.

## 2025-07-14 NOTE — PROGRESS NOTES
Subjective   Patient ID: Jayda Gonzales is a 39 y.o. female who presents for No chief complaint on file..    HPI   Jayda Gonzales is a 39 y.o. 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  Test results discussed with her.  prescription for STD check.       Family Hx: mom(breast cancer,HTN, DM, Kidney issues)Dad(asthma, HTN)    Social Hx: never tobacco smoker, medical marijuana(IBS), quit drinking 2 yrs ago, no other illegal drugs, works, lives with 2 boys.     Screenings: last PAP 2021, normal , due now. Doesn't take vaccines.     Review of Systems   Respiratory:  Positive for shortness of breath (Very minimal short of breath on exertion).         Has mild intermittent asthma.   All other systems reviewed and are negative.      Objective   LMP 06/16/2025     Physical Exam  Vitals and nursing note reviewed.   Cardiovascular:      Rate and Rhythm: Normal rate and regular rhythm.      Pulses: Normal pulses.      Heart sounds: Normal heart sounds. No murmur heard.     No friction rub. No gallop.   Pulmonary:      Effort: Pulmonary effort is normal.      Breath sounds: Normal breath sounds. No wheezing, rhonchi or rales.   Abdominal:      General: Abdomen is flat. Bowel sounds are normal. There is no distension.      Tenderness: There is no abdominal tenderness.   Neurological:      Mental Status: She is alert. Mental status is at baseline.   Psychiatric:         Mood and Affect: Mood normal.         Assessment/Plan   Assessment & Plan  Echocardiogram findings abnormal, without diagnosis  - Patient is concerned about echo and vascular ultrasound of the carotid arteries  -Echo came normal EF, with trace MR and TR.   - Referral sent to cardiology for further evaluation and management  Orders:    Referral to Cardiology; Future    Women's annual routine gynecological examination    Orders:    Referral to Gynecology;  Future    Blood tests for routine general physical examination    Orders:    Comprehensive Metabolic Panel; Future    CBC; Future    TSH with reflex to Free T4 if abnormal; Future    Vitamin D 25-Hydroxy,Total (for eval of Vitamin D levels); Future    PTSD (post-traumatic stress disorder)    Orders:    Referral to Psychology; Future    Primary hypertension  - BP in the office today 133/86  -Patient was previously prescribed amlodipine 2.5 mg daily, but never started, she cut down her caffeine and still not much inclined to start medication.  -Recommended to check blood pressure daily at home, keep a log and send a copy to me.    Orders:    blood pressure monitor kit; 1 each once daily.    Screen for STD (sexually transmitted disease)    Orders:    C. trachomatis / N. gonorrhoeae, Amplified, Urogenital; Future    HIV 1/2 Antigen/Antibody Screen with Reflex to Confirmation; Future    Hepatitis B Surface Antigen; Future    Hepatitis C Antibody; Future    Syphilis Screen with Reflex; Future    Trichomonas vaginalis, Amplified; Future    Establishing care with new doctor, encounter for  -Reviewed previous charts and labs  -Ordered new labs  -Follow-up in 6 months           Healthcare maintenance  - Does not take any vaccines  -Last Pap 2018 normal, due now, referral sent to GYN.        - RTC in 6 months.

## 2025-07-14 NOTE — ASSESSMENT & PLAN NOTE
- BP in the office today 133/86  -Patient was previously prescribed amlodipine 2.5 mg daily, but never started, she cut down her caffeine and still not much inclined to start medication.  -Recommended to check blood pressure daily at home, keep a log and send a copy to me.    Orders:    blood pressure monitor kit; 1 each once daily.

## 2025-07-15 LAB
25(OH)D3+25(OH)D2 SERPL-MCNC: 23 NG/ML (ref 30–100)
ALBUMIN SERPL-MCNC: 4.6 G/DL (ref 3.6–5.1)
ALP SERPL-CCNC: 41 U/L (ref 31–125)
ALT SERPL-CCNC: 14 U/L (ref 6–29)
ANION GAP SERPL CALCULATED.4IONS-SCNC: 7 MMOL/L (CALC) (ref 7–17)
AST SERPL-CCNC: 17 U/L (ref 10–30)
BILIRUB SERPL-MCNC: 0.4 MG/DL (ref 0.2–1.2)
BUN SERPL-MCNC: 14 MG/DL (ref 7–25)
C TRACH RRNA SPEC QL NAA+PROBE: NOT DETECTED
CALCIUM SERPL-MCNC: 9.5 MG/DL (ref 8.6–10.2)
CHLORIDE SERPL-SCNC: 105 MMOL/L (ref 98–110)
CO2 SERPL-SCNC: 27 MMOL/L (ref 20–32)
CREAT SERPL-MCNC: 0.78 MG/DL (ref 0.5–0.97)
EGFRCR SERPLBLD CKD-EPI 2021: 99 ML/MIN/1.73M2
ERYTHROCYTE [DISTWIDTH] IN BLOOD BY AUTOMATED COUNT: 12.8 % (ref 11–15)
GLUCOSE SERPL-MCNC: 82 MG/DL (ref 65–139)
HCT VFR BLD AUTO: 41.6 % (ref 35–45)
HGB BLD-MCNC: 13.4 G/DL (ref 11.7–15.5)
MCH RBC QN AUTO: 29.3 PG (ref 27–33)
MCHC RBC AUTO-ENTMCNC: 32.2 G/DL (ref 32–36)
MCV RBC AUTO: 90.8 FL (ref 80–100)
N GONORRHOEA RRNA SPEC QL NAA+PROBE: NOT DETECTED
PLATELET # BLD AUTO: 238 THOUSAND/UL (ref 140–400)
PMV BLD REES-ECKER: 10.8 FL (ref 7.5–12.5)
POTASSIUM SERPL-SCNC: 4.3 MMOL/L (ref 3.5–5.3)
PROT SERPL-MCNC: 6.9 G/DL (ref 6.1–8.1)
QUEST GC CT AMPLIFIED (ALWAYS MESSAGE): NORMAL
RBC # BLD AUTO: 4.58 MILLION/UL (ref 3.8–5.1)
SODIUM SERPL-SCNC: 139 MMOL/L (ref 135–146)
T VAGINALIS RRNA SPEC QL NAA+PROBE: NOT DETECTED
TSH SERPL-ACNC: 1.64 MIU/L
WBC # BLD AUTO: 5.2 THOUSAND/UL (ref 3.8–10.8)

## 2025-07-17 LAB
HBV SURFACE AG SERPL QL IA: NORMAL
HCV AB SERPL QL IA: NORMAL
T PALLIDUM AB SER QL IA: NEGATIVE

## 2025-08-25 ENCOUNTER — APPOINTMENT (OUTPATIENT)
Dept: CARDIOLOGY | Facility: CLINIC | Age: 40
End: 2025-08-25
Payer: COMMERCIAL

## 2025-08-25 DIAGNOSIS — R01.1 HEART MURMUR: Primary | ICD-10-CM

## 2025-08-28 ENCOUNTER — OFFICE VISIT (OUTPATIENT)
Dept: URGENT CARE | Age: 40
End: 2025-08-28
Payer: COMMERCIAL

## 2025-08-28 VITALS
DIASTOLIC BLOOD PRESSURE: 91 MMHG | SYSTOLIC BLOOD PRESSURE: 141 MMHG | OXYGEN SATURATION: 99 % | HEIGHT: 65 IN | RESPIRATION RATE: 18 BRPM | BODY MASS INDEX: 24.66 KG/M2 | TEMPERATURE: 98.8 F | HEART RATE: 52 BPM | WEIGHT: 148 LBS

## 2025-08-28 DIAGNOSIS — R52 BODY ACHES: ICD-10-CM

## 2025-08-28 DIAGNOSIS — U07.1 COVID-19: Primary | ICD-10-CM

## 2025-08-28 LAB
POC CORONAVIRUS SARS-COV-2 PCR: POSITIVE
POC HUMAN RHINOVIRUS PCR: NEGATIVE
POC INFLUENZA A VIRUS PCR: NEGATIVE
POC INFLUENZA B VIRUS PCR: NEGATIVE
POC RESPIRATORY SYNCYTIAL VIRUS PCR: NEGATIVE

## 2025-08-28 PROCEDURE — 3080F DIAST BP >= 90 MM HG: CPT

## 2025-08-28 PROCEDURE — 99214 OFFICE O/P EST MOD 30 MIN: CPT

## 2025-08-28 PROCEDURE — 87631 RESP VIRUS 3-5 TARGETS: CPT

## 2025-08-28 PROCEDURE — 3077F SYST BP >= 140 MM HG: CPT

## 2025-08-28 PROCEDURE — 3008F BODY MASS INDEX DOCD: CPT

## 2025-08-28 ASSESSMENT — ENCOUNTER SYMPTOMS
SORE THROAT: 1
FEVER: 1
FATIGUE: 1

## 2025-10-06 ENCOUNTER — APPOINTMENT (OUTPATIENT)
Dept: OBSTETRICS AND GYNECOLOGY | Facility: CLINIC | Age: 40
End: 2025-10-06
Payer: COMMERCIAL

## 2026-01-06 ENCOUNTER — APPOINTMENT (OUTPATIENT)
Dept: OBSTETRICS AND GYNECOLOGY | Facility: CLINIC | Age: 41
End: 2026-01-06
Payer: COMMERCIAL

## 2026-01-12 ENCOUNTER — APPOINTMENT (OUTPATIENT)
Dept: PRIMARY CARE | Facility: CLINIC | Age: 41
End: 2026-01-12
Payer: COMMERCIAL